# Patient Record
Sex: FEMALE | Race: BLACK OR AFRICAN AMERICAN | Employment: FULL TIME | URBAN - METROPOLITAN AREA
[De-identification: names, ages, dates, MRNs, and addresses within clinical notes are randomized per-mention and may not be internally consistent; named-entity substitution may affect disease eponyms.]

---

## 2023-07-19 ENCOUNTER — APPOINTMENT (OUTPATIENT)
Dept: ULTRASOUND IMAGING | Facility: HOSPITAL | Age: 23
End: 2023-07-19
Attending: EMERGENCY MEDICINE
Payer: COMMERCIAL

## 2023-07-19 ENCOUNTER — HOSPITAL ENCOUNTER (EMERGENCY)
Facility: HOSPITAL | Age: 23
Discharge: HOME | End: 2023-07-19
Attending: EMERGENCY MEDICINE
Payer: COMMERCIAL

## 2023-07-19 ENCOUNTER — HOSPITAL ENCOUNTER (OUTPATIENT)
Age: 23
Discharge: ANOTHER HOSPITAL | End: 2023-07-19
Payer: COMMERCIAL

## 2023-07-19 VITALS
HEIGHT: 63 IN | DIASTOLIC BLOOD PRESSURE: 80 MMHG | BODY MASS INDEX: 35.44 KG/M2 | WEIGHT: 200 LBS | RESPIRATION RATE: 18 BRPM | SYSTOLIC BLOOD PRESSURE: 128 MMHG | HEART RATE: 74 BPM | OXYGEN SATURATION: 99 % | TEMPERATURE: 98 F

## 2023-07-19 VITALS
SYSTOLIC BLOOD PRESSURE: 145 MMHG | DIASTOLIC BLOOD PRESSURE: 68 MMHG | HEART RATE: 99 BPM | TEMPERATURE: 98 F | OXYGEN SATURATION: 100 % | RESPIRATION RATE: 16 BRPM

## 2023-07-19 DIAGNOSIS — R10.9 ABDOMINAL PAIN IN PREGNANCY, FIRST TRIMESTER: Primary | ICD-10-CM

## 2023-07-19 DIAGNOSIS — O26.891 ABDOMINAL PAIN IN PREGNANCY, FIRST TRIMESTER: Primary | ICD-10-CM

## 2023-07-19 DIAGNOSIS — O20.0 ABORTION, THREATENED, EARLY PREGNANCY: Primary | ICD-10-CM

## 2023-07-19 LAB
ANION GAP SERPL CALC-SCNC: 10 MMOL/L (ref 0–18)
B-HCG SERPL-ACNC: 927 MIU/ML
B-HCG UR QL: POSITIVE
BASOPHILS # BLD AUTO: 0.02 X10(3) UL (ref 0–0.2)
BASOPHILS NFR BLD AUTO: 0.3 %
BUN BLD-MCNC: 5 MG/DL (ref 7–18)
BUN/CREAT SERPL: 6.3 (ref 10–20)
CALCIUM BLD-MCNC: 9.2 MG/DL (ref 8.5–10.1)
CHLORIDE SERPL-SCNC: 108 MMOL/L (ref 98–112)
CLARITY UR: CLEAR
CO2 SERPL-SCNC: 22 MMOL/L (ref 21–32)
COLOR UR: YELLOW
CREAT BLD-MCNC: 0.79 MG/DL
DEPRECATED RDW RBC AUTO: 41.9 FL (ref 35.1–46.3)
EOSINOPHIL # BLD AUTO: 0.02 X10(3) UL (ref 0–0.7)
EOSINOPHIL NFR BLD AUTO: 0.3 %
ERYTHROCYTE [DISTWIDTH] IN BLOOD BY AUTOMATED COUNT: 13.3 % (ref 11–15)
GFR SERPLBLD BASED ON 1.73 SQ M-ARVRAT: 107 ML/MIN/1.73M2 (ref 60–?)
GLUCOSE BLD-MCNC: 93 MG/DL (ref 70–99)
GLUCOSE UR STRIP-MCNC: NEGATIVE MG/DL
HCT VFR BLD AUTO: 37.8 %
HGB BLD-MCNC: 11.7 G/DL
HGB UR QL STRIP: NEGATIVE
IMM GRANULOCYTES # BLD AUTO: 0.02 X10(3) UL (ref 0–1)
IMM GRANULOCYTES NFR BLD: 0.3 %
KETONES UR STRIP-MCNC: 80 MG/DL
LYMPHOCYTES # BLD AUTO: 2.04 X10(3) UL (ref 1–4)
LYMPHOCYTES NFR BLD AUTO: 25.9 %
MCH RBC QN AUTO: 26.6 PG (ref 26–34)
MCHC RBC AUTO-ENTMCNC: 31 G/DL (ref 31–37)
MCV RBC AUTO: 85.9 FL
MONOCYTES # BLD AUTO: 0.45 X10(3) UL (ref 0.1–1)
MONOCYTES NFR BLD AUTO: 5.7 %
NEUTROPHILS # BLD AUTO: 5.34 X10 (3) UL (ref 1.5–7.7)
NEUTROPHILS # BLD AUTO: 5.34 X10(3) UL (ref 1.5–7.7)
NEUTROPHILS NFR BLD AUTO: 67.5 %
NITRITE UR QL STRIP: NEGATIVE
OSMOLALITY SERPL CALC.SUM OF ELEC: 287 MOSM/KG (ref 275–295)
PH UR STRIP: 6 [PH]
PLATELET # BLD AUTO: 386 10(3)UL (ref 150–450)
POTASSIUM SERPL-SCNC: 3.9 MMOL/L (ref 3.5–5.1)
RBC # BLD AUTO: 4.4 X10(6)UL
RH BLOOD TYPE: POSITIVE
SODIUM SERPL-SCNC: 140 MMOL/L (ref 136–145)
SP GR UR STRIP: 1.02
UROBILINOGEN UR STRIP-ACNC: <2 MG/DL
WBC # BLD AUTO: 7.9 X10(3) UL (ref 4–11)

## 2023-07-19 PROCEDURE — 99284 EMERGENCY DEPT VISIT MOD MDM: CPT

## 2023-07-19 PROCEDURE — 36415 COLL VENOUS BLD VENIPUNCTURE: CPT

## 2023-07-19 PROCEDURE — 99214 OFFICE O/P EST MOD 30 MIN: CPT | Performed by: PHYSICIAN ASSISTANT

## 2023-07-19 PROCEDURE — 81002 URINALYSIS NONAUTO W/O SCOPE: CPT | Performed by: PHYSICIAN ASSISTANT

## 2023-07-19 PROCEDURE — 84702 CHORIONIC GONADOTROPIN TEST: CPT | Performed by: EMERGENCY MEDICINE

## 2023-07-19 PROCEDURE — 86900 BLOOD TYPING SEROLOGIC ABO: CPT | Performed by: EMERGENCY MEDICINE

## 2023-07-19 PROCEDURE — 81025 URINE PREGNANCY TEST: CPT | Performed by: PHYSICIAN ASSISTANT

## 2023-07-19 PROCEDURE — 85025 COMPLETE CBC W/AUTO DIFF WBC: CPT | Performed by: EMERGENCY MEDICINE

## 2023-07-19 PROCEDURE — 80048 BASIC METABOLIC PNL TOTAL CA: CPT | Performed by: EMERGENCY MEDICINE

## 2023-07-19 PROCEDURE — 76817 TRANSVAGINAL US OBSTETRIC: CPT | Performed by: EMERGENCY MEDICINE

## 2023-07-19 PROCEDURE — 86901 BLOOD TYPING SEROLOGIC RH(D): CPT | Performed by: EMERGENCY MEDICINE

## 2023-07-19 PROCEDURE — 76801 OB US < 14 WKS SINGLE FETUS: CPT | Performed by: EMERGENCY MEDICINE

## 2023-07-19 PROCEDURE — 99285 EMERGENCY DEPT VISIT HI MDM: CPT

## 2023-07-19 NOTE — ED INITIAL ASSESSMENT (HPI)
Pt reports lower abdominal pain. Reports having late period, with abdominal cramping. Headache, started yesterday.

## 2023-07-19 NOTE — DISCHARGE INSTRUCTIONS
It is very important that you see a doctor and have a repeat beta-hCG drawn in 2 days and return to emergency department if you develop any further pain or bleeding as an ectopic pregnancy cannot entirely be ruled out

## 2023-07-19 NOTE — ED QUICK NOTES
Pt to ED from [facility] with c/o lower pelvic pain. Pt states she found out she was pregnant today at [facility]. Pt denies fall or trauma. Pt states normal BM today. Pt denies black or bloody stools. Pt c/o nausea without vomiting. Denies fever. . Pt denies dysuria or hematuria. Pt is alert and oriented x4. Pt skin parameters WNL. Pt ambulating bu self with steady gait. IV established to RAC #20G in triage-SL. Awaiting labs and imaging. Will continue to monitor.